# Patient Record
Sex: FEMALE | Race: BLACK OR AFRICAN AMERICAN
[De-identification: names, ages, dates, MRNs, and addresses within clinical notes are randomized per-mention and may not be internally consistent; named-entity substitution may affect disease eponyms.]

---

## 2018-05-07 ENCOUNTER — HOSPITAL ENCOUNTER (EMERGENCY)
Dept: HOSPITAL 25 - ED | Age: 18
Discharge: HOME | End: 2018-05-07
Payer: SELF-PAY

## 2018-05-07 VITALS — SYSTOLIC BLOOD PRESSURE: 151 MMHG | DIASTOLIC BLOOD PRESSURE: 99 MMHG

## 2018-05-07 DIAGNOSIS — S89.91XA: Primary | ICD-10-CM

## 2018-05-07 DIAGNOSIS — F17.200: ICD-10-CM
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
## 2018-05-07 PROCEDURE — 99282 EMERGENCY DEPT VISIT SF MDM: CPT

## 2018-05-07 NOTE — ED
Lower Extremity





- HPI Summary


HPI Summary: 


Patient is a 17-year-old female who presents emergency department for a right 

knee injury that occurred yesterday.  Pt. states she was walking when she felt 

a pop in the medial aspect of her right knee and fell to the ground.  

Associated symptoms of edema.  Patient states today pain is improved but she 

still having pain with walking and presents for evaluation.  Symptoms are mild 

in severity.








- History of Current Complaint


Chief Complaint: EDExtremityLower


Stated Complaint: FALL/RT KNEE PAIN


Time Seen by Provider: 05/07/18 21:39


Hx Obtained From: Patient


Pain Intensity: 7





- Allergies/Home Medications


Allergies/Adverse Reactions: 


 Allergies











Allergy/AdvReac Type Severity Reaction Status Date / Time


 


No Known Allergies Allergy   Verified 11/29/15 03:29














PMH/Surg Hx/FS Hx/Imm Hx


Previously Healthy: Yes


Infectious Disease History: No


Infectious Disease History: 


   Denies: Traveled Outside the [place] in Last 30 Days





- Social History


Occupation: Employed Full-time


Lives: With Family


Alcohol Use: Occasionally


Substance Use Type: Reports: Marijuana


Smoking Status (MU): Current Every Day Smoker





Review of Systems


Positive: Other - right knee pain


Negative: Weakness, Paresthesia, Numbness


All Other Systems Reviewed And Are Negative: Yes





Physical Exam


Triage Information Reviewed: Yes


Vital Signs On Initial Exam: 


 Initial Vitals











Temp Pulse Resp BP Pulse Ox


 


 98.6 F   92   20   124/59   99 


 


 05/07/18 20:05  05/07/18 20:05  05/07/18 20:05  05/07/18 20:05  05/07/18 20:05











Vital Signs Reviewed: Yes


Appearance: Positive: Well-Appearing - Patient lying in bed in no acute 

distress.  Pleasant.


Head/Face: Positive: Normal Head/Face Inspection


Eyes: Positive: Normal, EDINSON


Neck: Positive: Supple


Musculoskeletal: Positive: Other - Pain on palpation to the medial aspect of 

the right knee.  No overlying erythema.  Pain with flexion of the knee.  No 

increase in laxity.  Leg is neurovascularly intact.


Neurological: Positive: Normal, CN Intact II-III


Psychiatric: Positive: Normal





Diagnostics





- Vital Signs


 Vital Signs











  Temp Pulse Resp BP Pulse Ox


 


 05/07/18 20:05  98.6 F  92  20  124/59  99














- Laboratory


Lab Statement: Any lab studies that have been ordered have been reviewed, and 

results considered in the medical decision making process.





Lower Extremity Course/Dx





- Course


Course Of Treatment: Patient presenting to the ER for a minor right knee 

injury.  X-ray is negative for acute findings, reading per radiology.  Ace wrap 

was placed.  Patient is able to ambulate and declines crutches.  Recently moved 

to the area, advised to call the for lying to establish PCP.  Also given 

information for orthopedics for follow-up symptoms continue.  To ice and 

elevate. NSAIDS her pain as directed.  Patient understands and agrees with plan.





- Diagnoses


Differential Diagnosis/HQI/PQRI: Positive: Fracture (Closed), Sprain, Strain


Provider Diagnoses: 


 Knee pain








Discharge





- Sign-Out/Discharge


Documenting (check all that apply): Discharge/Admit/Transfer





- Discharge Plan


Condition: Good


Disposition: HOME


Patient Education Materials:  Knee Sprain (ED)


Forms:  *Work Release


Referrals: 


[facility] [place] [facility]FERR[facility] [Outside]


Alon Hammond MD [Medical Doctor] - 


No Primary Care Phys,NOPCP [Primary Care Provider] - 


Additional Instructions: 


Call the physician referral line to establish a PCP


Call Dr. Hammond's office to follow up with orthopedics if symptoms persist


Ice and elevate


Ibuprofen for pain as directed


Activity as tolerated








- Billing Disposition and Condition


Condition: GOOD


Disposition: HOME

## 2018-05-07 NOTE — RAD
INDICATION: Right knee pain



COMPARISON: None

 

TECHNIQUE: 4 view radiograph of the right knee.



FINDINGS: 



The visualized bones are well-corticated and properly aligned. The joint spaces are

properly maintained. There is no radiographic evidence of joint effusion. There is no

acute fracture, dislocation or other focal bony abnormality.



IMPRESSION:  Normal knee radiograph as described above. 



If the patient's symptoms persist, follow-up imaging is recommended.